# Patient Record
Sex: MALE | Race: WHITE | NOT HISPANIC OR LATINO | Employment: FULL TIME | URBAN - METROPOLITAN AREA
[De-identification: names, ages, dates, MRNs, and addresses within clinical notes are randomized per-mention and may not be internally consistent; named-entity substitution may affect disease eponyms.]

---

## 2017-01-24 ENCOUNTER — OFFICE VISIT - HEALTHEAST (OUTPATIENT)
Dept: FAMILY MEDICINE | Facility: CLINIC | Age: 37
End: 2017-01-24

## 2017-01-24 ENCOUNTER — COMMUNICATION - HEALTHEAST (OUTPATIENT)
Dept: TELEHEALTH | Facility: CLINIC | Age: 37
End: 2017-01-24

## 2017-01-24 DIAGNOSIS — M10.9 ACUTE GOUTY ARTHROPATHY: ICD-10-CM

## 2017-01-24 DIAGNOSIS — M10.9 GOUT FLARE: ICD-10-CM

## 2018-01-24 ENCOUNTER — COMMUNICATION - HEALTHEAST (OUTPATIENT)
Dept: FAMILY MEDICINE | Facility: CLINIC | Age: 38
End: 2018-01-24

## 2018-01-24 DIAGNOSIS — M10.9 ACUTE GOUTY ARTHROPATHY: ICD-10-CM

## 2018-05-04 ENCOUNTER — OFFICE VISIT - HEALTHEAST (OUTPATIENT)
Dept: FAMILY MEDICINE | Facility: CLINIC | Age: 38
End: 2018-05-04

## 2018-05-04 DIAGNOSIS — M10.9 GOUT FLARE: ICD-10-CM

## 2018-05-04 RX ORDER — COLCHICINE 0.6 MG/1
0.6 TABLET ORAL DAILY
Qty: 30 TABLET | Refills: 3 | Status: SHIPPED | OUTPATIENT
Start: 2018-05-04 | End: 2021-06-29

## 2018-05-04 RX ORDER — PREDNISONE 10 MG/1
TABLET ORAL
Qty: 32 TABLET | Refills: 0 | Status: SHIPPED | OUTPATIENT
Start: 2018-05-04 | End: 2021-06-29

## 2018-12-19 ENCOUNTER — OFFICE VISIT - HEALTHEAST (OUTPATIENT)
Dept: FAMILY MEDICINE | Facility: CLINIC | Age: 38
End: 2018-12-19

## 2018-12-19 DIAGNOSIS — D22.9 MULTIPLE ATYPICAL NEVI: ICD-10-CM

## 2018-12-19 DIAGNOSIS — Z30.2 ENCOUNTER FOR VASECTOMY: ICD-10-CM

## 2018-12-26 ENCOUNTER — COMMUNICATION - HEALTHEAST (OUTPATIENT)
Dept: FAMILY MEDICINE | Facility: CLINIC | Age: 38
End: 2018-12-26

## 2018-12-26 DIAGNOSIS — M10.9 GOUT FLARE: ICD-10-CM

## 2018-12-27 RX ORDER — ALLOPURINOL 100 MG/1
100 TABLET ORAL DAILY
Qty: 90 TABLET | Refills: 1 | Status: SHIPPED | OUTPATIENT
Start: 2018-12-27 | End: 2021-06-29

## 2019-01-10 ENCOUNTER — COMMUNICATION - HEALTHEAST (OUTPATIENT)
Dept: ADMINISTRATIVE | Facility: CLINIC | Age: 39
End: 2019-01-10

## 2019-02-06 ENCOUNTER — RECORDS - HEALTHEAST (OUTPATIENT)
Dept: ADMINISTRATIVE | Facility: OTHER | Age: 39
End: 2019-02-06

## 2019-03-15 ENCOUNTER — RECORDS - HEALTHEAST (OUTPATIENT)
Dept: ADMINISTRATIVE | Facility: OTHER | Age: 39
End: 2019-03-15

## 2021-05-30 VITALS — WEIGHT: 226.3 LBS | BODY MASS INDEX: 26.15 KG/M2

## 2021-06-01 VITALS — WEIGHT: 224 LBS | BODY MASS INDEX: 25.89 KG/M2

## 2021-06-02 VITALS — WEIGHT: 230.06 LBS | BODY MASS INDEX: 26.59 KG/M2

## 2021-06-08 NOTE — PROGRESS NOTES
SUBJECTIVE: Matt Kirkland is a 36 y.o.  male who presents today with a complaint of left knee gout flare.  He was put on Uloric 40 mg a while back and that really helped him with preventing flares.  Unfortunately does not come in on a regular basis and again this year he ran out of medication and as soon as he got on the airplane he had another flare of gout.  His first flare came when he was younger and was drinking too much beer, the next 4 flares were all related to having a flight.  We discussed perhaps he is becoming more dehydrated.  We discussed that he needs to come in on a regular yearly basis so that we can refill his meds, because otherwise he will have another flare as he has high uric acid in his blood.    OBJECTIVE:   Visit Vitals     /80     Pulse 90     Wt (!) 226 lb 4.8 oz (102.6 kg)     BMI 26.15 kg/m2     General: Healthy-appearing young male in no acute distress   Heart: Regular rate and rhythm without murmur  Lungs: Clear bilaterally  Abdomen: Soft, nontender  Extremities: Left knee has decreased range of motion secondary to pain and swelling    ASSESSMENT & PLAN:    1. Gout flare  predniSONE (DELTASONE) 10 MG tablet   2. Acute Gout  febuxostat (ULORIC) 40 mg tablet     I've given him a prednisone taper which he has done well with in the past for the acute gout.  I have given him one full year of his preventative prescription.  He is to see us back in one years time.    Patient Active Problem List   Diagnosis     Acute Gout     Nicotine Dependence     Foot Pain (Soft Tissue)       Current Outpatient Prescriptions on File Prior to Visit   Medication Sig Dispense Refill     [DISCONTINUED] febuxostat (ULORIC) 40 mg tablet Take 1 tablet (40 mg total) by mouth daily. 90 tablet 3     No current facility-administered medications on file prior to visit.

## 2021-06-17 NOTE — PROGRESS NOTES
ASSESSMENT/PLAN:       1. Gout flare    - predniSONE (DELTASONE) 10 mg tablet; Take 4 tabs for 3 days, 3 tabs for 3 days, 2 tabs for 3 days,1 tab for 3 days, half tab for 4 days  Dispense: 32 tablet; Refill: 0  - colchicine (COLCRYS) 0.6 mg tablet; Take 1 tablet (0.6 mg total) by mouth daily.  Dispense: 30 tablet; Refill: 3, use for at least 2-3 months  - allopurinol (ZYLOPRIM) 100 MG tablet; Take 1 tablet (100 mg total) by mouth daily.  Dispense: 90 tablet; Refill: 3, begin in about in 1 month if symptoms of the acute flare have resolved  Will stop Uloric  While using prednisone and colchicine do  not to use ibuprofen and less having severe pain.  The patient states that typically when starting prednisone his symptoms resolve in 1-2 days.  Goal would be to get his uric acid to 6.5 or less and this may require more than 100 mg daily of allopurinol so it would be reasonable for him to return in about 3 months to check a uric acid.    25 minutes spent with patient total with 20 minutes in review of history, past labs and counseling    Homer Huerta MD      PROGRESS NOTE   5/4/2018    SUBJECTIVE:  Matt Kirkland is a 38 y.o. male  who presents for   Chief Complaint   Patient presents with     Follow-up     med check     Gout     Right leg flare up, started 2 weeks ago    #1 acute gout flare  For the last month the patient has had pain and swelling primarily in the right ankle which has become much worse over the last 2 weeks.  He did associate worsening of his symptoms to increased alcohol consumption one weekend.  He has had a recurrent history of gout involving his lower extremity knee ankle foot and toe.  Most recent flareup was a couple years ago.  The patient was put on Uloric for prophylaxis but has not been able to afford taking it on a regular basis.  For short time he was on allopurinol but it did not seem to be helping but it sounds like he was started at the time of an acute flareup of gout.  The last  couple weeks he has been using ibuprofen 800 mg 3 times a day.  The patient states that he does not consume alcohol on a daily basis but it has tends to be more of a weekend occurrence and when he does drink it tends to be beer and can be excessive.  Last uric acid level was 10.8      Patient Active Problem List   Diagnosis     Acute Gout     Foot Pain (Soft Tissue)       Current Outpatient Prescriptions   Medication Sig Dispense Refill     allopurinol (ZYLOPRIM) 100 MG tablet Take 1 tablet (100 mg total) by mouth daily. 90 tablet 3     colchicine (COLCRYS) 0.6 mg tablet Take 1 tablet (0.6 mg total) by mouth daily. 30 tablet 3     predniSONE (DELTASONE) 10 mg tablet Take 4 tabs for 3 days, 3 tabs for 3 days, 2 tabs for 3 days,1 tab for 3 days, half tab for 4 days 32 tablet 0     No current facility-administered medications for this visit.        History   Smoking Status     Former Smoker   Smokeless Tobacco     Not on file     Review of systems:  The patient denies involvement of other joints  He has not had a fever   have not been any other skin rashes.  Denies chest pain  Denies shortness of breath      OBJECTIVE:        No results found for this or any previous visit (from the past 240 hour(s)).    Vitals:    05/04/18 0858   BP: 118/84   Patient Position: Sitting   Pulse: 82   SpO2: 98%   Weight: (!) 224 lb (101.6 kg)     Weight: 224 lb (101.6 kg)        Physical Exam:  GENERAL APPEARANCE: Pleasant 38-year-old male, complaining of right ankle pain, well hydrated, well nourished  SKIN: Right lower leg ankle and foot is red and swollen  EXTREMITY: Joint edema right ankle  with decreased ROM   NEURO: no focal findings

## 2021-06-18 NOTE — LETTER
Letter by Homer Huerta MD at      Author: Homer Huerta MD Service: -- Author Type: --    Filed:  Encounter Date: 1/10/2019 Status: (Other)       Matt Kirkland  [id number] [address]                 January 10, 2019       Dear Mr. Kirkland,      Recently, Dr. Homer Huerta referred you to:      [facility]    Attached is the order placed by your provider.   If you feel the need to see this specialty office, please call their  Office at your earliest convenience for assistance in scheduling an appointment.    [phone]       Please call with questions or contact us using [employer].      Sincerely,        Electronically signed by Homer Huerta MD

## 2021-06-22 NOTE — PROGRESS NOTES
ASSESSMENT/PLAN:       1. Multiple atypical nevi    - Ambulatory referral to Dermatology  I think it would be best for him to have a referral to dermatology to have them look at these 2 atypical appearing nevi and I suspect that they may need to be biopsied.  He agrees with that plan and the order was placed    2. Encounter for vasectomy    - Ambulatory referral to Urology  Discussion about vasectomy and that it should be considered to be permanent although a reversal can be attempted.  He is aware that he will need to be seen for consultation before the procedure is done.      Homer Huerta MD      PROGRESS NOTE   12/19/2018    SUBJECTIVE:  Matt Kirkland is a 38 y.o. male  who presents for   Chief Complaint   Patient presents with     Follow-up     med check, derm referral- concerns two two moles        1. Multiple atypical nevi  The patient has 2 moles that he would like to have checked and it is concerned because they look different than the rest of his moles although the one on his back is hard for him to see but his girlfriend has brought it to his attention and something should be checked with that.  One in his back is been there for many years and the one on his leg is not been there as long but that when he can see and definitely has changed.  Family history of skin cancer nonmelanoma type.  This is been in a grandfather and also his mother.  He has had a fair amount of sun exposure over the years.  Does not always use sunblock.    2. Encounter for vasectomy  The patient wants to discuss vasectomy.  He has 1 child and his girlfriend has 3 children and neither of them want to have more children.  He like to consider something permanent such as a vasectomy.  Patient Active Problem List   Diagnosis     Acute Gout     Foot Pain (Soft Tissue)       Current Outpatient Medications   Medication Sig Dispense Refill     allopurinol (ZYLOPRIM) 100 MG tablet Take 1 tablet (100 mg total) by mouth daily. 90 tablet 3      colchicine (COLCRYS) 0.6 mg tablet Take 1 tablet (0.6 mg total) by mouth daily. 30 tablet 3     predniSONE (DELTASONE) 10 mg tablet Take 4 tabs for 3 days, 3 tabs for 3 days, 2 tabs for 3 days,1 tab for 3 days, half tab for 4 days 32 tablet 0     No current facility-administered medications for this visit.        Social History     Tobacco Use   Smoking Status Former Smoker           OBJECTIVE:        No results found for this or any previous visit (from the past 240 hour(s)).    Vitals:    12/19/18 0907   BP: 138/80   Patient Position: Sitting   Cuff Size: Adult Large   Pulse: 85   SpO2: 98%   Weight: (!) 230 lb 1 oz (104.4 kg)     Weight: (!) 230 lb 1 oz (104.4 kg)          Physical Exam:  GENERAL APPEARANCE: 38-year-old male, NAD, well hydrated, well nourished  SKIN: Right upper leg there is a nevus that has some asymmetry to the coloring it measures 7 mm in largest diameter the center part of it is dark raised in the outer part is a lighter colored brown.  The border seems to be regular.  The other nevus is on his right low back laterally and measures 7 mm.  That likewise has some asymmetry to the color and texture with the center being more raised and nodular-like.  ABDOMEN: S&NT, no masses or enlarged organs   EXTREMITY: no edema and full ROM of all joints  NEURO: no focal findings

## 2021-06-29 ENCOUNTER — OFFICE VISIT (OUTPATIENT)
Dept: FAMILY MEDICINE | Facility: CLINIC | Age: 41
End: 2021-06-29
Payer: COMMERCIAL

## 2021-06-29 VITALS
BODY MASS INDEX: 25.45 KG/M2 | HEIGHT: 78 IN | DIASTOLIC BLOOD PRESSURE: 81 MMHG | WEIGHT: 220 LBS | HEART RATE: 93 BPM | TEMPERATURE: 98.7 F | OXYGEN SATURATION: 99 % | SYSTOLIC BLOOD PRESSURE: 116 MMHG

## 2021-06-29 DIAGNOSIS — M10.9 ACUTE GOUT OF LEFT KNEE, UNSPECIFIED CAUSE: Primary | ICD-10-CM

## 2021-06-29 LAB
ERYTHROCYTE [DISTWIDTH] IN BLOOD BY AUTOMATED COUNT: 14.4 % (ref 10–15)
ERYTHROCYTE [SEDIMENTATION RATE] IN BLOOD BY WESTERGREN METHOD: 6 MM/H (ref 0–15)
HCT VFR BLD AUTO: 40.4 % (ref 40–53)
HGB BLD-MCNC: 14.2 G/DL (ref 13.3–17.7)
MCH RBC QN AUTO: 32.5 PG (ref 26.5–33)
MCHC RBC AUTO-ENTMCNC: 35.1 G/DL (ref 31.5–36.5)
MCV RBC AUTO: 92 FL (ref 78–100)
PLATELET # BLD AUTO: 227 10E9/L (ref 150–450)
RBC # BLD AUTO: 4.37 10E12/L (ref 4.4–5.9)
WBC # BLD AUTO: 7.3 10E9/L (ref 4–11)

## 2021-06-29 PROCEDURE — 85652 RBC SED RATE AUTOMATED: CPT | Performed by: NURSE PRACTITIONER

## 2021-06-29 PROCEDURE — 36415 COLL VENOUS BLD VENIPUNCTURE: CPT | Performed by: NURSE PRACTITIONER

## 2021-06-29 PROCEDURE — 99204 OFFICE O/P NEW MOD 45 MIN: CPT | Performed by: NURSE PRACTITIONER

## 2021-06-29 PROCEDURE — 84550 ASSAY OF BLOOD/URIC ACID: CPT | Performed by: NURSE PRACTITIONER

## 2021-06-29 PROCEDURE — 85027 COMPLETE CBC AUTOMATED: CPT | Performed by: NURSE PRACTITIONER

## 2021-06-29 PROCEDURE — 86140 C-REACTIVE PROTEIN: CPT | Performed by: NURSE PRACTITIONER

## 2021-06-29 RX ORDER — ALLOPURINOL 100 MG/1
100 TABLET ORAL DAILY
Qty: 90 TABLET | Refills: 0 | Status: SHIPPED | OUTPATIENT
Start: 2021-06-29 | End: 2021-07-31

## 2021-06-29 RX ORDER — PREDNISONE 20 MG/1
TABLET ORAL
Qty: 14 TABLET | Refills: 0 | Status: SHIPPED | OUTPATIENT
Start: 2021-06-29 | End: 2021-07-09

## 2021-06-29 ASSESSMENT — MIFFLIN-ST. JEOR: SCORE: 2046.16

## 2021-06-29 NOTE — PROGRESS NOTES
"  Assessment & Plan     Acute gout of left knee, unspecified cause  Symptoms and presentation consistent with gout.    Labs today to ensure no infection process.   Steroids to decrease symptoms of current flare.  I discussed having him be on allopurinol long-term.  He would need to wait until his current flare is over prior to starting allopurinol.  He could have a flare of gout if on allopurinol without colchicine.  He acknowledges this and would like to proceed with allopurinol only.  He can let me know if he has a flareup on allopurinol only.  Written education provided.  Encourage close follow-up with physical examination with repeat uric acid within the next few weeks.  Red flag symptoms discussed and if these occur present to the emergency room or call 911.  Matt verbalizes understanding of plan of care and is in agreement.   - Uric acid  - allopurinol (ZYLOPRIM) 100 MG tablet  Dispense: 90 tablet; Refill: 0  - predniSONE (DELTASONE) 20 MG tablet  Dispense: 14 tablet; Refill: 0  - CBC with platelets  - CRP, inflammation  - ESR: Erythrocyte sedimentation rate  - Uric acid  - CRP, inflammation    BMI:   Estimated body mass index is 25.42 kg/m  as calculated from the following:    Height as of this encounter: 1.981 m (6' 6\").    Weight as of this encounter: 99.8 kg (220 lb).     Return in about 4 weeks (around 7/27/2021) for Wellness exam fasting labs.    Neema Mejia, [facility]   Matt is a 41 year old who presents for the following health issues     HPI     Gout/ Single Inflamed Joint  Onset/Duration: yesterday morning  Description:   Location: knee - left  Joint Swelling: YES  Redness: no  Pain: YES- sensitive to touch, pressure currently  Intensity: moderate  Progression of Symptoms: changing - less pain more swelling  Accompanying Signs & Symptoms:  Fevers: no  History:   Trauma to the area: no  Previous history of gout: YES- 2018 last episode - foot  Recent " "illness: no  Alcohol use: YES- beer  Diuretic use: no  Precipitating or alleviating factors: Cherry juice, ibuprofen  Therapies tried and outcome: see above    Reports this has happened in his knee before. Tender just having his sheet touch his knee.  Reports he had a house warming party with seafood and alcohol.  In the past steroids worked well.  Was on allopurinol has not been on for quite some time.  Reports he did not take colchicine with the allopurinol.    Review of Systems   Constitutional, HEENT, cardiovascular, pulmonary, GI, , musculoskeletal, neuro, skin, endocrine and psych systems are negative, except as otherwise noted in the HPI.      Objective    /81 (BP Location: Left arm, Patient Position: Chair, Cuff Size: Adult Large)   Pulse 93   Temp 98.7  F (37.1  C) (Tympanic)   Ht 1.981 m (6' 6\")   Wt 99.8 kg (220 lb)   SpO2 99%   BMI 25.42 kg/m    Body mass index is 25.42 kg/m .  Physical Exam   GENERAL: healthy, alert and no distress  RESP: lungs clear to auscultation - no rales, rhonchi or wheezes  CV: regular rate and rhythm, normal S1 S2, no S3 or S4, no murmur, click or rub, no peripheral edema and peripheral pulses strong  ABDOMEN: soft, nontender, no hepatosplenomegaly, no masses and bowel sounds normal  MS: left knee edema/tight tender to palpation mild decreased flexion full extension, no significant erythema or warmth   SKIN: no suspicious lesions or rashes  NEURO: Normal strength and tone, mentation intact and speech normal  PSYCH: mentation appears normal, affect normal/bright    Results for orders placed or performed in visit on 06/29/21   Uric acid     Status: Abnormal   Result Value Ref Range    Uric Acid 8.4 (H) 3.5 - 7.2 mg/dL   CBC with platelets     Status: Abnormal   Result Value Ref Range    WBC 7.3 4.0 - 11.0 10e9/L    RBC Count 4.37 (L) 4.4 - 5.9 10e12/L    Hemoglobin 14.2 13.3 - 17.7 g/dL    Hematocrit 40.4 40.0 - 53.0 %    MCV 92 78 - 100 fl    MCH 32.5 26.5 - 33.0 pg "    MCHC 35.1 31.5 - 36.5 g/dL    RDW 14.4 10.0 - 15.0 %    Platelet Count 227 150 - 450 10e9/L   CRP, inflammation     Status: Abnormal   Result Value Ref Range    CRP Inflammation 11.0 (H) 0.0 - 8.0 mg/L   ESR: Erythrocyte sedimentation rate     Status: None   Result Value Ref Range    Sed Rate 6 0 - 15 mm/h

## 2021-06-30 LAB
CRP SERPL-MCNC: 11 MG/L (ref 0–8)
URATE SERPL-MCNC: 8.4 MG/DL (ref 3.5–7.2)

## 2021-07-01 NOTE — RESULT ENCOUNTER NOTE
Note to Staff: please call the patient to explain results and to check on current symptoms.      Uric acid and CRP elevated consistent with gout.-Normal red blood cell (hgb) levels, normal white blood cell count and normal platelet levels.   Continue steroids for current flare. Discussed restating his allopurinol which could cause a flare without colchicine also. He needs to wait until symptoms improved in current flare before starting allopurinol and ideally recheck a uric acid and ensure improvement before starting.     Encourage office visit within the next 3 months to recheck and redo uric acid at that time  to make sure allopurinol  dose correct.     I have futured uric acid for next week he can set up a non fasting lab only.     Neema Mejia, [id number]

## 2021-07-31 ENCOUNTER — NURSE TRIAGE (OUTPATIENT)
Dept: NURSING | Facility: CLINIC | Age: 41
End: 2021-07-31

## 2021-07-31 DIAGNOSIS — M10.9 ACUTE GOUT OF LEFT KNEE, UNSPECIFIED CAUSE: ICD-10-CM

## 2021-07-31 RX ORDER — ALLOPURINOL 100 MG/1
100 TABLET ORAL DAILY
Qty: 60 TABLET | Refills: 0 | Status: SHIPPED | OUTPATIENT
Start: 2021-07-31 | End: 2022-10-06

## 2021-07-31 NOTE — TELEPHONE ENCOUNTER
Patient calling requesting pharmacy change on remaining refills for Allopurinol.    Last filled 6/29/21. Patient stating he received 30 tablets.  [place] and is requesting pharmacy change to  remaining prescription.  allopurinol (ZYLOPRIM) 100 MG tablet  90 tablet 0 6/29/2021 7/31/2021 No   Sig - Route: Take 1 tablet (100 mg) by mouth daily - Oral     Reordered remaining 60 tablets to [facility] per patient request.  Reviewed office visit notes from 6/29/21 with patient.      Margarita Gregorio RN  [facility]      Reason for Disposition    Caller requesting a refill, no triage required, and triager able to refill per unit policy    Additional Information    Negative: Drug overdose and triager unable to answer question    Negative: Caller requesting information unrelated to medicine    Negative: Caller requesting a prescription for Strep throat and has a positive culture result    Negative: Rash while taking a medication or within 3 days of stopping it    Negative: Immunization reaction suspected    Negative: [1] Asthma and [2] having symptoms of asthma (cough, wheezing, etc.)    Negative: [1] Influenza symptoms AND [2] anti-viral med prescription request, such as Tamiflu    Negative: [1] Symptom of illness (e.g., headache, abdominal pain, earache, vomiting) AND [2] more than mild    Negative: MORE THAN A DOUBLE DOSE of a prescription or over-the-counter (OTC) drug    Negative: [1] DOUBLE DOSE (an extra dose or lesser amount) of over-the-counter (OTC) drug AND [2] any symptoms (e.g., dizziness, nausea, pain, sleepiness)    Negative: [1] DOUBLE DOSE (an extra dose or lesser amount) of prescription drug AND [2] any symptoms (e.g., dizziness, nausea, pain, sleepiness)    Negative: Took another person's prescription drug    Negative: [1] DOUBLE DOSE (an extra dose or lesser amount) of prescription drug AND [2] NO symptoms (Exception: a double dose of  "antibiotics)    Negative: Diabetes drug error or overdose (e.g., took wrong type of insulin or took extra dose)    Negative: [1] Request for URGENT new prescription or refill of \"essential\" medication (i.e., likelihood of harm to patient if not taken) AND [2] triager unable to fill per unit policy    Negative: [1] Prescription not at pharmacy AND [2] was prescribed by PCP recently    Negative: [1] Pharmacy calling with prescription questions AND [2] triager unable to answer question    Negative: [1] Caller has URGENT medication question about med that PCP or specialist prescribed AND [2] triager unable to answer question    Negative: [1] Caller has NON-URGENT medication question about med that PCP prescribed AND [2] triager unable to answer question    Negative: [1] Caller requesting a NON-URGENT new prescription or refill AND [2] triager unable to refill per unit policy    Negative: [1] Caller has medication question about med not prescribed by PCP AND [2] triager unable to answer question (e.g., compatibility with other med, storage)    Negative: Caller requesting a CONTROLLED substance prescription refill (e.g., narcotics, ADHD medicines)    Negative: Caller wants to use a complementary or alternative medicine    Negative: [1] Prescription prescribed recently is not at pharmacy AND [2] triager has access to patient's EMR AND [3] prescription is recorded in the EMR    Negative: [1] DOUBLE DOSE (an extra dose or lesser amount) of over-the-counter (OTC) drug AND [2] NO symptoms    Negative: [1] DOUBLE DOSE (an extra dose or lesser amount) of antibiotic drug AND [2] NO symptoms    Negative: Caller has medication question only, adult not sick, and triager answers question    Negative: Caller has medication question, adult has minor symptoms, caller declines triage, AND triager answers question    Negative: Caller requesting information about medication use with breastfeeding; neither adult nor infant is ill, triager " answers question    Negative: Caller requesting information about medication during pregnancy; adult is not ill AND triager answers question    Protocols used: MEDICATION QUESTION CALL-A-AH

## 2021-08-22 ENCOUNTER — HEALTH MAINTENANCE LETTER (OUTPATIENT)
Age: 41
End: 2021-08-22

## 2021-10-17 ENCOUNTER — HEALTH MAINTENANCE LETTER (OUTPATIENT)
Age: 41
End: 2021-10-17

## 2022-03-05 ENCOUNTER — NURSE TRIAGE (OUTPATIENT)
Dept: NURSING | Facility: CLINIC | Age: 42
End: 2022-03-05
Payer: COMMERCIAL

## 2022-03-05 ENCOUNTER — OFFICE VISIT (OUTPATIENT)
Dept: URGENT CARE | Facility: URGENT CARE | Age: 42
End: 2022-03-05
Payer: COMMERCIAL

## 2022-03-05 VITALS
RESPIRATION RATE: 20 BRPM | DIASTOLIC BLOOD PRESSURE: 83 MMHG | TEMPERATURE: 98.7 F | OXYGEN SATURATION: 98 % | SYSTOLIC BLOOD PRESSURE: 120 MMHG | HEART RATE: 87 BPM

## 2022-03-05 DIAGNOSIS — M10.9 ACUTE GOUT OF LEFT KNEE, UNSPECIFIED CAUSE: ICD-10-CM

## 2022-03-05 DIAGNOSIS — M25.562 ACUTE PAIN OF LEFT KNEE: Primary | ICD-10-CM

## 2022-03-05 LAB
BASOPHILS # BLD AUTO: 0 10E3/UL (ref 0–0.2)
BASOPHILS NFR BLD AUTO: 0 %
EOSINOPHIL # BLD AUTO: 0.1 10E3/UL (ref 0–0.7)
EOSINOPHIL NFR BLD AUTO: 1 %
ERYTHROCYTE [DISTWIDTH] IN BLOOD BY AUTOMATED COUNT: 14.5 % (ref 10–15)
ERYTHROCYTE [SEDIMENTATION RATE] IN BLOOD BY WESTERGREN METHOD: 4 MM/HR (ref 0–15)
HCT VFR BLD AUTO: 44.7 % (ref 40–53)
HGB BLD-MCNC: 15.8 G/DL (ref 13.3–17.7)
LYMPHOCYTES # BLD AUTO: 1.2 10E3/UL (ref 0.8–5.3)
LYMPHOCYTES NFR BLD AUTO: 13 %
MCH RBC QN AUTO: 33.5 PG (ref 26.5–33)
MCHC RBC AUTO-ENTMCNC: 35.3 G/DL (ref 31.5–36.5)
MCV RBC AUTO: 95 FL (ref 78–100)
MONOCYTES # BLD AUTO: 0.8 10E3/UL (ref 0–1.3)
MONOCYTES NFR BLD AUTO: 9 %
NEUTROPHILS # BLD AUTO: 7.4 10E3/UL (ref 1.6–8.3)
NEUTROPHILS NFR BLD AUTO: 78 %
PLATELET # BLD AUTO: 172 10E3/UL (ref 150–450)
RBC # BLD AUTO: 4.72 10E6/UL (ref 4.4–5.9)
WBC # BLD AUTO: 9.5 10E3/UL (ref 4–11)

## 2022-03-05 PROCEDURE — 85025 COMPLETE CBC W/AUTO DIFF WBC: CPT | Performed by: FAMILY MEDICINE

## 2022-03-05 PROCEDURE — 85652 RBC SED RATE AUTOMATED: CPT | Performed by: FAMILY MEDICINE

## 2022-03-05 PROCEDURE — 36415 COLL VENOUS BLD VENIPUNCTURE: CPT | Performed by: FAMILY MEDICINE

## 2022-03-05 PROCEDURE — 84550 ASSAY OF BLOOD/URIC ACID: CPT | Performed by: FAMILY MEDICINE

## 2022-03-05 PROCEDURE — 99214 OFFICE O/P EST MOD 30 MIN: CPT | Performed by: FAMILY MEDICINE

## 2022-03-05 RX ORDER — ALLOPURINOL 100 MG/1
100 TABLET ORAL DAILY
Qty: 90 TABLET | Refills: 0 | Status: SHIPPED | OUTPATIENT
Start: 2022-03-05 | End: 2022-10-06

## 2022-03-05 RX ORDER — PREDNISONE 20 MG/1
TABLET ORAL
Qty: 14 TABLET | Refills: 0 | Status: SHIPPED | OUTPATIENT
Start: 2022-03-05 | End: 2022-10-06

## 2022-03-05 NOTE — PATIENT INSTRUCTIONS
Patient Education     Treating Gout Attacks     Raising the joint above the level of your heart can help reduce gout symptoms.     Gout is a disease that affects the joints. It is caused by excess uric acid in your blood that may lead to crystals forming in your joints. Left untreated, it can lead to painful foot and joint deformities and even kidney problems. But, by treating gout early, you can relieve pain and help prevent future problems. Gout can usually be treated with medicine and proper diet. In severe cases, surgery may be needed.  Gout attacks are painful and often happen more than once. Taking medicines may reduce pain and prevent attacks in the future. There are also some things you can do at home to relieve symptoms.  Medicines for gout  Your healthcare provider may prescribe a daily medicine to reduce levels of uric acid. Reducing your uric acid levels may help prevent gout attacks. Allopurinol is one commonly used medicine taken daily to reduce uric acid levels. Other daily medicines used to reduce uric acid levels include febuxostat, lesinurad, and probencid. Other medicines can help relieve pain and swelling during an acute attack. Medicines such as NSAIDs (nonsteroidal anti-inflammatory medicines), steroids, and colchicine may be prescribed for intermittent use to relieve an acute gout attack. Be sure to take your medicine as directed.  What you can do  Below are some things you can do at home to relieve gout symptoms. Your healthcare provider may have other tips.    Rest the painful joint as much as you can.    Raise the painful joint so it is at a level higher than your heart.    Use ice for 10 minutes every 1 to 2 hours as possible.  How can I prevent gout?  With a little effort, you may be able to prevent gout attacks in the future. Here are some things you can do:    Don't eat foods high in purines  ? Certain meats (red meat, processed meat, turkey)  ? Organ meats (kidney, liver,  sweetbread)  ? Shellfish (lobster, crab, shrimp, scallop, mussel)  ? Certain fish (anchovy, sardine, herring, mackerel)    Take any medicines prescribed by your healthcare provider.    Lose weight if you need to.    Reduce high fructose corn syrup in meals and drinks.    Reduce or cut out alcohol, particularly beer, but also red wine and spirits.    Control blood pressure, diabetes, and cholesterol.    Drink plenty of water to help flush uric acid from your body.  [employer] last reviewed this educational content on 4/1/2018 2000-2021 [employer]. All rights reserved. This information is not intended as a substitute for professional medical care. Always follow your healthcare professional's instructions.           Patient Education     Gout Diet  Gout is a painful condition caused by an excess of uric acid, a waste product made by the body. Uric acid forms crystals that collect in the joints. The immune response to these crystals brings on symptoms of joint pain and swelling. This is called a gout attack. Often, medications and diet changes are combined to manage gout. Below are some guidelines for changing your diet to help you manage gout and prevent attacks. Your healthcare provider will help you determine the best eating plan for you.  Eating to manage gout  Weight loss for those who are overweight may help reduce gout attacks.  Eat less of these foods  Eating too many foods containing purines may raise the levels of uric acid in your body. This raises your risk for a gout attack. Try to limit these foods and drinks:    Alcohol, such as beer and red wine. You may be told to avoid alcohol completely.    Soft drinks that contain sugar or high fructose corn syrup    Certain fish, including anchovies, sardines, fish eggs, and herring    Shellfish    Certain meats, such as red meat, hot dogs, luncheon meats, and turkey    Organ meats, such as liver, kidneys, and sweetbreads    Legumes, such as dried  beans and peas    Other high fat foods such as gravy, whole milk, and high fat cheeses    Vegetables such as asparagus, cauliflower, spinach, and mushrooms used to be thought to contribute to an increased risk for a gout attack, but recent studies show that high purine vegetables don't increase the risk for a gout attack.  Eat more of these foods  Other foods may be helpful for people with gout. Add some of these foods to your diet:    Cherries contain chemicals that may lower uric acid.    Omega fatty acids. These are found in some fatty fish such as salmon, certain oils (flax, olive, or nut), and nuts themselves. Omega fatty acids may help prevent inflammation due to gout.    Dairy products that are low-fat or fat-free, such as cheese and yogurt    Complex carbohydrate foods, including whole grains, brown rice, oats, and beans    Coffee, in moderation    Water, approximately 64 ounces per day  Follow-up care  Follow up with your healthcare provider as advised.  When to seek medical advice  Call your healthcare provider right away if any of these occur:    Return of gout symptoms, usually at night:    Severe pain, swelling, and heat in a joint, especially the base of the big toe    Affected joint is hard to move    Skin of the affected joint is purple or red    Fever of 100.4 F (38 C) or higher    Pain that doesn't get better even with prescribed medicine   [employer] last reviewed this educational content on 6/1/2018 2000-2021 [employer]. All rights reserved. This information is not intended as a substitute for professional medical care. Always follow your healthcare professional's instructions.           Patient Education     Eating to Prevent Gout  Gout is a painful form of arthritis caused by an excess of uric acid. This is a waste product made by the body. It builds up in the body and forms crystals that collect in the joints, causing a gout attack. Alcohol and certain foods can trigger a gout  attack. Below are some guidelines for changing your diet to help you manage gout. Your healthcare provider can work with you to determine the best eating plan for you. You can learn which foods affect your gout more than others. Reactions to different foods can vary from person to person. Know that diet is only one part of managing gout. Take your medicines as prescribed and follow the other guidelines your healthcare provider has given you.   Foods to limit  Eating too many foods containing purines may increase the levels of uric acid in your body and increase your risk for a gout attack. It may be best to limit these high-purine foods:     Alcohol (beer, hard liquor and red wine). You may be told to give up alcohol completely.    Certain fish (anchovies, sardines, fish roes, herring, tuna, mussels, codfish, scallops, trout, and dianna)    Certain meats (red meat, processed meat, monique, turkey, wild game, and goose)    Sauces and gravies made with meat    Organ meats (such as liver, kidneys, sweetbreads, and tripe)    Legumes (such as dried beans and peas)    Mushrooms, spinach, asparagus, and cauliflower    Yeast and yeast extract supplements  Foods to try  Some foods may be helpful for people with gout. You may want to try adding some of the following foods to your diet:     Dark berries. These include blueberries, blackberries, and cherries. These berries contain chemicals that may lower uric acid.    Tofu. Tofu, which is made from soy, is a good source of protein. Studies have shown that it may be a better choice than meat for people with gout.    Omega fatty acids. These acids are found in fatty fish (such as salmon), certain oils (such as flax, olive, or nut oils), or nuts. They may help prevent inflammation due to gout.  Gout guidelines  The following guidelines are recommended by the Academy of Nutrition and Dietetics for people with gout. Your diet should be:     High in fiber, whole grains, fruits, and  vegetables    Low in protein. About 15% of calories should come from protein. Choose lean sources, such as soy, lean meats, and poultry without the skin.    Low in fat. No more than 30% of calories should come from fat, with only 10% coming from animal (saturated) fat.  [employer] last reviewed this educational content on 8/1/2020 2000-2021 [employer]. All rights reserved. This information is not intended as a substitute for professional medical care. Always follow your healthcare professional's instructions.

## 2022-03-05 NOTE — PROGRESS NOTES
Chief Complaint   Patient presents with     Urgent Care     Arthritis     Gout flare up-Right knee-Rx ran out     Initial differential diagnosis included but was not restricted to   Differential Diagnosis:  MS Injury Pain: sprain, tendonitis, muscle strain, contusion, gout, septic arthritis and osteoarthritis  Medical Decision Making:    ASSESMENT AND PLAN   Matt was seen today for urgent care and arthritis.    Diagnoses and all orders for this visit:    Acute pain of left knee  -     ESR: Erythrocyte sedimentation rate; Future  -     CBC with platelets and differential; Future  -     Uric acid; Future  -     ESR: Erythrocyte sedimentation rate  -     CBC with platelets and differential  -     Uric acid    Acute gout of left knee, unspecified cause  -     predniSONE (DELTASONE) 20 MG tablet; Take 2 tablets (40 mg) by mouth daily for 3 days, THEN 1.5 tablets (30 mg) daily for 3 days, THEN 1 tablet (20 mg) daily for 2 days, THEN 0.5 tablets (10 mg) daily for 2 days  -     allopurinol (ZYLOPRIM) 100 MG tablet; Take 1 tablet (100 mg) by mouth daily    was given a detailed hand out on gout to patient   Discussed with pt to start the allopurinol after the acute flare attack has resolved   Tylenol, Ibuprofen and Rest  Routine discharge counseling was given to the patient and the patient understands that worsening, changing or persistent symptoms should prompt an immediate call or follow up with their primary physician or the emergency department. The importance of appropriate follow up was also discussed with the patient.     I have reviewed the nursing notes.  Review of the result(s) of each unique test -   X-Ray was not done.    Time  spent on the date of the encounter doing chart review, review of test results, interpretation of tests, patient visit and documentation   see orders in Epic  Pt verbalized and agreed with the plan and is aware of the worsening symptoms for which would need to follow up .  Pt was stable  during time of discharge from the clinic     SUBJECTIVE     Matt Kirkland is a 41 year old male presenting with a chief complaint of    Chief Complaint   Patient presents with     Urgent Care     Arthritis     Gout flare up-Right knee-Rx ran out     MS Injury/Pain    Onset of symptoms was 1 day(s) ago.  Location: left knee  Context: h/o gout pain in allopurinol in the past has done it for 3 months and then stopped it last gout attack was a year back.  Patient has been more active working on his basement but did not have any injury to the knee or any ligament injury.              Patient experienced immediate pain, delayed pain, delayed swelling  Course of symptoms is worsening.    Severity moderate  Current and Associated symptoms: Pain, Swelling, Warmth, Tenderness and Decreased range of motion  Denies  Redness and Stiffness  Aggravating Factors: walking, weight-bearing, movement and flexion/extension  Therapies to improve symptoms include: ibuprofen  This is not the first time this type of problem has occurred for this patient.     .    History reviewed. No pertinent past medical history.  Current Outpatient Medications   Medication Sig Dispense Refill     allopurinol (ZYLOPRIM) 100 MG tablet Take 1 tablet (100 mg) by mouth daily 90 tablet 0     predniSONE (DELTASONE) 20 MG tablet Take 2 tablets (40 mg) by mouth daily for 3 days, THEN 1.5 tablets (30 mg) daily for 3 days, THEN 1 tablet (20 mg) daily for 2 days, THEN 0.5 tablets (10 mg) daily for 2 days 14 tablet 0     allopurinol (ZYLOPRIM) 100 MG tablet Take 1 tablet (100 mg) by mouth daily 60 tablet 0     Social History     Tobacco Use     Smoking status: Former Smoker     Smokeless tobacco: Never Used   Substance Use Topics     Alcohol use: Yes     History reviewed. No pertinent family history.      ROS:    10 point ROS of systems including Constitutional, Eyes, Respiratory, Cardiovascular, Gastroenterology, Genitourinary, Integumentary, Psychiatric  ,neurological were all negative except for pertinent positives noted in my HPI         OBJECTIVE:    /83   Pulse 87   Temp 98.7  F (37.1  C)   Resp 20   SpO2 98%   GENERAL APPEARANCE: healthy, alert and no distress  EYES: EOMI,  PERRL, conjunctiva clear  CV: regular rates and rhythm, normal S1 S2, no murmur noted  MS: left side knee swelling, tenderness to palpation and decreased ROM   PSYCH: mentation appears normal  Physical Exam      (Note was completed, in part, with [employer] voice-recognition software. Documentation reviewed, but some grammatical, spelling, and word errors may remain.)  Verito Wilhelm MD on 3/5/2022 at 12:09 PM

## 2022-03-05 NOTE — TELEPHONE ENCOUNTER
Patient is requesting a refill on his Allopurinol gout medication.  Patient says her rarely has flare-ups and is having one now, but is out of medication.  Triage guidelines recommend to call pcp within 24 hours.  Caller verbalized and understands directives.  COVID 19 Nurse Triage Plan/Patient Instructions    Please be aware that novel coronavirus (COVID-19) may be circulating in the community. If you develop symptoms such as fever, cough, or SOB or if you have concerns about the presence of another infection including coronavirus (COVID-19), please contact your health care provider or visit [email or web address].     Disposition/Instructions    Virtual Visit with provider recommended. Reference Visit Selection Guide.    Thank you for taking steps to prevent the spread of this virus.  o Limit your contact with others.  o Wear a simple mask to cover your cough.  o Wash your hands well and often.    Resources    [facility]: About COVID-19: [email or web address]    CDC: What to Do If You're Sick: www.cdc.gov/coronavirus/2019-ncov/about/steps-when-sick.html    CDC: Ending Home Isolation: www.cdc.gov/coronavirus/2019-ncov/hcp/disposition-in-home-patients.html     CDC: Caring for Someone: www.cdc.gov/coronavirus/2019-ncov/if-you-are-sick/care-for-someone.html     [facility]: Interim Guidance for Hospital Discharge to Home: [email or web address]/coronavirus/hcp/hospdischarge.pdf    [facility] clinical trials (COVID-19 research studies): [email or web address]/n-[email or web address]-trials     Below are the COVID-19 hotlines at the [place] Department of Health ([facility]). Interpreters are available.   o For health questions: Call 720-447-1336 or 1-274.767.4636 (7 a.m. to 7 p.m.)  o For questions about schools and childcare: Call 608-132-3824 or 1-400.905.3153 (7 a.m. to 7 p.m.)                     Reason for Disposition    [1] Caller requesting a NON-URGENT new prescription or refill AND [2]  "triager unable to refill per unit policy    Additional Information    Negative: Drug overdose and triager unable to answer question    Negative: Caller requesting information unrelated to medicine    Negative: Caller requesting a prescription for Strep throat and has a positive culture result    Negative: Rash while taking a medication or within 3 days of stopping it    Negative: Immunization reaction suspected    Negative: [1] Asthma and [2] having symptoms of asthma (cough, wheezing, etc.)    Negative: [1] Influenza symptoms AND [2] anti-viral med prescription request, such as Tamiflu    Negative: [1] Symptom of illness (e.g., headache, abdominal pain, earache, vomiting) AND [2] more than mild    Negative: MORE THAN A DOUBLE DOSE of a prescription or over-the-counter (OTC) drug    Negative: [1] DOUBLE DOSE (an extra dose or lesser amount) of over-the-counter (OTC) drug AND [2] any symptoms (e.g., dizziness, nausea, pain, sleepiness)    Negative: [1] DOUBLE DOSE (an extra dose or lesser amount) of prescription drug AND [2] any symptoms (e.g., dizziness, nausea, pain, sleepiness)    Negative: Took another person's prescription drug    Negative: [1] DOUBLE DOSE (an extra dose or lesser amount) of prescription drug AND [2] NO symptoms (Exception: a double dose of antibiotics)    Negative: Diabetes drug error or overdose (e.g., took wrong type of insulin or took extra dose)    Negative: [1] Request for URGENT new prescription or refill of \"essential\" medication (i.e., likelihood of harm to patient if not taken) AND [2] triager unable to fill per unit policy    Negative: [1] Prescription not at pharmacy AND [2] was prescribed by PCP recently    Negative: [1] Pharmacy calling with prescription questions AND [2] triager unable to answer question    Negative: [1] Caller has URGENT medication question about med that PCP or specialist prescribed AND [2] triager unable to answer question    Negative: [1] Caller has NON-URGENT " medication question about med that PCP prescribed AND [2] triager unable to answer question    Protocols used: MEDICATION QUESTION CALL-A-AH

## 2022-03-06 LAB — URATE SERPL-MCNC: 8 MG/DL (ref 3.5–7.2)

## 2022-10-01 ENCOUNTER — HEALTH MAINTENANCE LETTER (OUTPATIENT)
Age: 42
End: 2022-10-01

## 2022-10-06 ENCOUNTER — VIRTUAL VISIT (OUTPATIENT)
Dept: FAMILY MEDICINE | Facility: CLINIC | Age: 42
End: 2022-10-06
Payer: COMMERCIAL

## 2022-10-06 DIAGNOSIS — M1A.4790 OTHER SECONDARY CHRONIC GOUT OF ANKLE WITHOUT TOPHUS, UNSPECIFIED LATERALITY: ICD-10-CM

## 2022-10-06 DIAGNOSIS — M10.9 ACUTE GOUT OF LEFT KNEE, UNSPECIFIED CAUSE: Primary | ICD-10-CM

## 2022-10-06 PROCEDURE — 99213 OFFICE O/P EST LOW 20 MIN: CPT | Mod: GT | Performed by: FAMILY MEDICINE

## 2022-10-06 RX ORDER — ALLOPURINOL 100 MG/1
100 TABLET ORAL DAILY
Qty: 90 TABLET | Refills: 3 | Status: SHIPPED | OUTPATIENT
Start: 2022-10-06 | End: 2023-10-22

## 2022-10-06 RX ORDER — PREDNISONE 20 MG/1
TABLET ORAL
Qty: 14 TABLET | Refills: 0 | Status: SHIPPED | OUTPATIENT
Start: 2022-10-06 | End: 2023-10-22

## 2022-10-06 NOTE — PROGRESS NOTES
"Mtat is a 42 year old who is being evaluated via a billable video visit.      How would you like to obtain your AVS? MyChart  If the video visit is dropped, the invitation should be resent by: Text to cell phone: [phone]  Will anyone else be joining your video visit? No       Assessment & Plan     Acute gout of left knee, unspecified cause - will treat with prednisone taper, has worked well in the past.   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   - allopurinol (ZYLOPRIM) 100 MG tablet; Take 1 tablet (100 mg) by mouth daily    Return in about 1 year (around 10/6/2023) for as needed.    Chelsie Crwoley MD  [facility]      Subjective   Matt is a 42 year old, presenting for the following health issues:  Arthritis    HPI     Pt is wanting medication for his gout in his knee and ankle.    Similar to gout flares he's had in the past. Prednisone has been helpful in the past.     Stopped using allopurinol, ran out and then no flares for months. Would like to resume as he says it works.         Review of Systems   Constitutional, HEENT, cardiovascular, pulmonary, gi and gu systems are negative, except as otherwise noted.      Objective    Vitals - Patient Reported  Weight (Patient Reported): 104.3 kg (230 lb)  Height (Patient Reported): 198.1 cm (6' 6\")  BMI (Based on Pt Reported Ht/Wt): 26.58        Physical Exam   GENERAL: Healthy, alert and no distress  EYES: Eyes grossly normal to inspection.  No discharge or erythema, or obvious scleral/conjunctival abnormalities.  RESP: No audible wheeze, cough, or visible cyanosis.  No visible retractions or increased work of breathing.    SKIN: " Visible skin clear. No significant rash, abnormal pigmentation or lesions.  NEURO: Cranial nerves grossly intact.  Mentation and speech appropriate for age.  PSYCH: Mentation appears normal, affect normal/bright, judgement and insight intact, normal speech and appearance well-groomed.        Video-Visit Details    Video Start Time: 1:33 PM    Type of service:  Video Visit    Video End Time:1:46 PM    Originating Location (pt. Location): Home    Distant Location (provider location):  [facility]     Platform used for Video Visit: [employer]

## 2023-03-29 ENCOUNTER — OFFICE VISIT (OUTPATIENT)
Dept: URGENT CARE | Facility: URGENT CARE | Age: 43
End: 2023-03-29
Payer: COMMERCIAL

## 2023-03-29 ENCOUNTER — MYC MEDICAL ADVICE (OUTPATIENT)
Dept: FAMILY MEDICINE | Facility: CLINIC | Age: 43
End: 2023-03-29
Payer: COMMERCIAL

## 2023-03-29 ENCOUNTER — MYC REFILL (OUTPATIENT)
Dept: FAMILY MEDICINE | Facility: CLINIC | Age: 43
End: 2023-03-29

## 2023-03-29 VITALS
OXYGEN SATURATION: 97 % | HEART RATE: 99 BPM | TEMPERATURE: 99.3 F | SYSTOLIC BLOOD PRESSURE: 155 MMHG | DIASTOLIC BLOOD PRESSURE: 95 MMHG

## 2023-03-29 DIAGNOSIS — M10.9 ACUTE GOUT OF LEFT KNEE, UNSPECIFIED CAUSE: Primary | ICD-10-CM

## 2023-03-29 DIAGNOSIS — M10.9 ACUTE GOUT OF LEFT KNEE, UNSPECIFIED CAUSE: ICD-10-CM

## 2023-03-29 PROCEDURE — 99214 OFFICE O/P EST MOD 30 MIN: CPT | Performed by: FAMILY MEDICINE

## 2023-03-29 RX ORDER — INDOMETHACIN 25 MG/1
25 CAPSULE ORAL
Qty: 15 CAPSULE | Refills: 0 | Status: SHIPPED | OUTPATIENT
Start: 2023-03-29 | End: 2023-04-03

## 2023-03-29 RX ORDER — PREDNISONE 20 MG/1
60 TABLET ORAL DAILY
Qty: 21 TABLET | Refills: 0 | Status: SHIPPED | OUTPATIENT
Start: 2023-03-29 | End: 2023-04-05

## 2023-03-29 NOTE — PATIENT INSTRUCTIONS
Prednisone 60 mg daily for up to 7 days.    Use indomethacin in place of ibuprofen or naproxen up to three times per day for the next 5 days to help with pain and swelling.

## 2023-03-29 NOTE — PROGRESS NOTES
ICD-10-CM    1. Acute gout of left knee, unspecified cause  M10.9 predniSONE (DELTASONE) 20 MG tablet     indomethacin (INDOCIN) 25 MG capsule        ASSESSMENT:  Symptoms likely r/t to gout givento previous hx of gout attacks and patient not taking preventive medication consistently recently.  Prednisone has worked well per patient in the past, so will try that.  Very low suspicion for joint infection at this time.    PLAN:  Patient Instructions   Prednisone 60 mg daily for up to 7 days.    Use indomethacin in place of ibuprofen or naproxen up to three times per day for the next 5 days to help with pain and swelling.      SUBJECTIVE:  Matt Kirkland is a 43 year old that presents to the  this afternoon with c/o gout sx in his left knee. No recent injuries.  He stated that on Sunday evening he noted that he was having increased pain/discomfort in his left knee and slight swelling that started worsening over the night into Monday. He noted on Tuesday that when he woke up that he could hardly walk and that he has not gotten any better over the last 24 hours, but is not worsening. He does have a hx of gout attacks and that he is prescribed allopurinol, but has not been taking the medication as consistently as he should. He also noted that he has been over indulging in rich foods and alcohol over the last few weeks at family events and attributes this flare to this. He has noted that he has been feeling slightly feverish with chills the last few days. He has attempted RICE and Ibuprofen without much relief.     OBJECTIVE:  BP (!) 155/95   Pulse 99   Temp 99.3  F (37.4  C) (Tympanic)   SpO2 97%    GEN: NAD, changes positions slowly with antalgic gait  MUSCULOSKELETAL: Left anterior knee is slightly edematous with increased warmth. No popliteal discomfort upon palpation. No calf tenderness.  Very minimal erythema around the anterior knee and small effusion.  Tender to palpation generally in the anterior knee and  along the medial and lateral joint lines.  No red streaks extending proximally to suggest infectious etiology.

## 2023-03-30 RX ORDER — PREDNISONE 20 MG/1
TABLET ORAL
Qty: 14 TABLET | Refills: 0 | OUTPATIENT
Start: 2023-03-30

## 2023-10-21 ENCOUNTER — HEALTH MAINTENANCE LETTER (OUTPATIENT)
Age: 43
End: 2023-10-21

## 2023-10-22 ENCOUNTER — OFFICE VISIT (OUTPATIENT)
Dept: URGENT CARE | Facility: URGENT CARE | Age: 43
End: 2023-10-22
Payer: COMMERCIAL

## 2023-10-22 VITALS
DIASTOLIC BLOOD PRESSURE: 82 MMHG | OXYGEN SATURATION: 98 % | RESPIRATION RATE: 16 BRPM | SYSTOLIC BLOOD PRESSURE: 128 MMHG | TEMPERATURE: 98.1 F | HEART RATE: 93 BPM

## 2023-10-22 DIAGNOSIS — M10.9 ACUTE GOUT OF RIGHT FOOT, UNSPECIFIED CAUSE: ICD-10-CM

## 2023-10-22 DIAGNOSIS — M1A.4790 OTHER SECONDARY CHRONIC GOUT OF ANKLE WITHOUT TOPHUS, UNSPECIFIED LATERALITY: ICD-10-CM

## 2023-10-22 DIAGNOSIS — M25.561 ACUTE BILATERAL KNEE PAIN: Primary | ICD-10-CM

## 2023-10-22 DIAGNOSIS — M25.562 ACUTE BILATERAL KNEE PAIN: Primary | ICD-10-CM

## 2023-10-22 PROCEDURE — 99213 OFFICE O/P EST LOW 20 MIN: CPT | Performed by: PHYSICIAN ASSISTANT

## 2023-10-22 RX ORDER — PREDNISONE 20 MG/1
TABLET ORAL
Qty: 24 TABLET | Refills: 0 | Status: SHIPPED | OUTPATIENT
Start: 2023-10-22 | End: 2023-11-06

## 2023-10-22 RX ORDER — ALLOPURINOL 100 MG/1
100 TABLET ORAL 2 TIMES DAILY
Qty: 180 TABLET | Refills: 0 | Status: SHIPPED | OUTPATIENT
Start: 2023-10-22

## 2023-10-22 ASSESSMENT — ENCOUNTER SYMPTOMS
COLOR CHANGE: 1
JOINT SWELLING: 1
ARTHRALGIAS: 1
WOUND: 0
FEVER: 0

## 2023-10-22 NOTE — PROGRESS NOTES
Assessment & Plan:        ICD-10-CM    1. Acute bilateral knee pain  M25.561     M25.562       2. Acute gout of right foot, unspecified cause  M10.9 predniSONE (DELTASONE) 20 MG tablet      3. 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 Started right first MTP joint, now having symptoms in other right MTP joints. Noticed bilateral knee stiffness today. Currently on allopurinol which has helped avoid gouty attack for the past 6 months until this week.     Taking ibuprofen for pain. Helping mildly. Indocin not helpful in past. Prednisone has helped the best.   Course of tapering prednisone prescribed.     Due to gouty flare up today, discussed increasing to 200mg of allopurinol daily.   Can schedule follow up with PCP to monitor.       Return if symptoms worsen or fail to improve, for in 5-7 days.     At the end of the encounter, I discussed results, diagnosis, medications. Discussed red flags for immediate return to clinic/ER, as well as indications for follow up if no improvement. Patient understood and agreed to plan. Patient was stable for discharge.        Norma Nye PA-C on 10/22/2023 at 11:39 AM          Subjective:     HPI:    Matt is a 43 year old male who presents to clinic today for the following health issues:  Chief Complaint   Patient presents with    Arthritis     Pt reports gout flare up of his R foot and pain is starting to extend up to his knees X 1 week.      HPI    Patient with acute flare up of right foot gout for a week. Currently taking ibuprofen.   This morning developed knee stiffness. No erythema of swelling of knees.   Symptoms classic of gout.   Has been taking allopurinol 100mg daily for 6 months. Has worked well up until know.   Had viral illness recently.     Review of Systems   Constitutional:  Negative  for fever.   Musculoskeletal:  Positive for arthralgias and joint swelling.   Skin:  Positive for color change. Negative for wound.         Patient Active Problem List   Diagnosis    Acute Gout    Foot Pain (Soft Tissue)        No past medical history on file.    Social History     Tobacco Use    Smoking status: Former    Smokeless tobacco: Never   Substance Use Topics    Alcohol use: Yes     Comment: 3-4 times a week             Objective:     Vitals:    10/22/23 1118   BP: 128/82   BP Location: Right arm   Patient Position: Sitting   Cuff Size: Adult Regular   Pulse: 93   Resp: 16   Temp: 98.1  F (36.7  C)   TempSrc: Tympanic   SpO2: 98%         Physical Exam   EXAM:   Pleasant, alert, appropriate appearance. NAD.  Head Exam: Normocephalic, atraumatic.  EXT: right MTP joints with erythema, edema of right foot. No induration. Tenderness on palpation.   Bilateral knees without swelling. Pain with ROM bilaterally.         Results:  No results found for any visits on 10/22/23.

## 2024-12-08 ENCOUNTER — HEALTH MAINTENANCE LETTER (OUTPATIENT)
Age: 44
End: 2024-12-08

## 2025-02-02 ENCOUNTER — OFFICE VISIT (OUTPATIENT)
Dept: URGENT CARE | Facility: URGENT CARE | Age: 45
End: 2025-02-02
Payer: COMMERCIAL

## 2025-02-02 VITALS
WEIGHT: 218.9 LBS | DIASTOLIC BLOOD PRESSURE: 86 MMHG | TEMPERATURE: 98.6 F | BODY MASS INDEX: 25.3 KG/M2 | OXYGEN SATURATION: 98 % | RESPIRATION RATE: 16 BRPM | HEART RATE: 91 BPM | SYSTOLIC BLOOD PRESSURE: 125 MMHG

## 2025-02-02 DIAGNOSIS — M10.072 ACUTE IDIOPATHIC GOUT OF LEFT FOOT: Primary | ICD-10-CM

## 2025-02-02 PROCEDURE — 99213 OFFICE O/P EST LOW 20 MIN: CPT | Performed by: PHYSICIAN ASSISTANT

## 2025-02-02 RX ORDER — PREDNISONE 20 MG/1
TABLET ORAL
Qty: 20 TABLET | Refills: 0 | Status: SHIPPED | OUTPATIENT
Start: 2025-02-02

## 2025-02-02 RX ORDER — ALLOPURINOL 100 MG/1
100 TABLET ORAL 2 TIMES DAILY
Qty: 180 TABLET | Refills: 0 | Status: SHIPPED | OUTPATIENT
Start: 2025-02-02 | End: 2025-05-03

## 2025-02-02 NOTE — PROGRESS NOTES
Assessment & Plan     Acute idiopathic gout of left foot  No evidence of joint infection on exam today.  Patient notes prednisone has worked well in the past for him.  Prednisone taper Rx today.  Refilled his allopurinol prescription.  Strongly encouraged him to establish care with primary care provider for further refills.  Follow-up if any worsening symptoms.  Patient agrees with the plan.  - predniSONE (DELTASONE) 20 MG tablet  Dispense: 20 tablet; Refill: 0  - allopurinol (ZYLOPRIM) 100 MG tablet  Dispense: 180 tablet; Refill: 0       Return in about 1 week (around 2/9/2025) for Symptoms failing to improve.    Toyin Garcia PA-C  [facility] Gutierrez is a 44 year old male who presents to clinic today for the following health issues:  Chief Complaint   Patient presents with    Urgent Care    Arthritis     Gout flare up about 10 days now. Tx otc- ibuprofen. Pt hasn't seen his primary to get continue refills on his medication. Left foot that that is radiating to his knee       HPI    Patient is presenting to urgent care today with complaint of gout symptoms in his left great toe and right knee.  He denies any trauma or injury to the affected areas.  He notes onset of symptoms 10 days ago.  He has a history of gout, has been on allopurinol in the past, has run out.  Patient does not have a primary care provider.  Last gout flare was 16 months ago.  He has tried ibuprofen for the past 10 days with minimal relief.  He denies any recent fevers or chills.      Review of Systems  Constitutional, HEENT, cardiovascular, pulmonary, GI, , musculoskeletal, neuro, skin, endocrine and psych systems are negative, except as otherwise noted.      Objective    /86   Pulse 91   Temp 98.6  F (37  C) (Tympanic)   Resp 16   Wt 99.3 kg (218 lb 14.4 oz)   SpO2 98%   BMI 25.30 kg/m    Physical Exam   GENERAL: alert and no distress  MS: Left foot exam: Hallux valgus deformity noted  left great toe, there is moderate swelling and erythema noted at left great toe and extending on the lateral dorsal aspect of the left foot, there is tenderness to palpation, no open wounds, no acute drainage  Right knee exam: No gross deformities, swelling or ecchymosis noted.  Range of motion is limited due to pain.  Skin with no warmth to the touch.